# Patient Record
Sex: FEMALE | Race: WHITE | URBAN - METROPOLITAN AREA
[De-identification: names, ages, dates, MRNs, and addresses within clinical notes are randomized per-mention and may not be internally consistent; named-entity substitution may affect disease eponyms.]

---

## 2018-03-28 ENCOUNTER — HISTORICAL (OUTPATIENT)
Dept: ADMINISTRATIVE | Facility: HOSPITAL | Age: 53
End: 2018-03-28

## 2018-05-08 ENCOUNTER — HISTORICAL (OUTPATIENT)
Dept: ADMINISTRATIVE | Facility: HOSPITAL | Age: 53
End: 2018-05-08

## 2018-05-10 LAB — FINAL CULTURE: NORMAL

## 2018-05-18 ENCOUNTER — HISTORICAL (OUTPATIENT)
Dept: ADMINISTRATIVE | Facility: HOSPITAL | Age: 53
End: 2018-05-18

## 2018-09-27 ENCOUNTER — HISTORICAL (OUTPATIENT)
Dept: ADMINISTRATIVE | Facility: HOSPITAL | Age: 53
End: 2018-09-27

## 2018-11-02 LAB
INFLUENZA A ANTIGEN, POC: NEGATIVE
INFLUENZA B ANTIGEN, POC: NEGATIVE

## 2018-11-16 LAB
ABS NEUT (OLG): 4.12 X10(3)/MCL (ref 2.1–9.2)
ALBUMIN SERPL-MCNC: 3.6 GM/DL (ref 3.4–5)
ALBUMIN/GLOB SERPL: 1.1 RATIO (ref 1.1–2)
ALP SERPL-CCNC: 144 UNIT/L (ref 38–126)
ALT SERPL-CCNC: 47 UNIT/L (ref 12–78)
APPEARANCE, UA: CLEAR
APTT PPP: 35.2 SECOND(S) (ref 24.8–36.9)
AST SERPL-CCNC: 20 UNIT/L (ref 15–37)
BACTERIA SPEC CULT: ABNORMAL /HPF
BASOPHILS # BLD AUTO: 0.1 X10(3)/MCL (ref 0–0.2)
BASOPHILS NFR BLD AUTO: 1 %
BILIRUB SERPL-MCNC: 0.5 MG/DL (ref 0.2–1)
BILIRUB UR QL STRIP: NEGATIVE
BILIRUBIN DIRECT+TOT PNL SERPL-MCNC: 0.1 MG/DL (ref 0–0.5)
BILIRUBIN DIRECT+TOT PNL SERPL-MCNC: 0.4 MG/DL (ref 0–0.8)
BUN SERPL-MCNC: 14 MG/DL (ref 7–18)
CALCIUM SERPL-MCNC: 9 MG/DL (ref 8.5–10.1)
CHLORIDE SERPL-SCNC: 106 MMOL/L (ref 98–107)
CO2 SERPL-SCNC: 30 MMOL/L (ref 21–32)
COLOR UR: YELLOW
CREAT SERPL-MCNC: 0.96 MG/DL (ref 0.55–1.02)
EOSINOPHIL # BLD AUTO: 0.5 X10(3)/MCL (ref 0–0.9)
EOSINOPHIL NFR BLD AUTO: 8 %
ERYTHROCYTE [DISTWIDTH] IN BLOOD BY AUTOMATED COUNT: 12.8 % (ref 11.5–17)
GLOBULIN SER-MCNC: 3.3 GM/DL (ref 2.4–3.5)
GLUCOSE (UA): NEGATIVE
GLUCOSE SERPL-MCNC: 84 MG/DL (ref 74–106)
HCT VFR BLD AUTO: 51.2 % (ref 37–47)
HGB BLD-MCNC: 15.8 GM/DL (ref 12–16)
HGB UR QL STRIP: NEGATIVE
INR PPP: 1.2 (ref 0–1.3)
KETONES UR QL STRIP: NEGATIVE
LEUKOCYTE ESTERASE UR QL STRIP: NEGATIVE
LYMPHOCYTES # BLD AUTO: 1.4 X10(3)/MCL (ref 0.6–4.6)
LYMPHOCYTES NFR BLD AUTO: 22 %
MCH RBC QN AUTO: 30.3 PG (ref 27–31)
MCHC RBC AUTO-ENTMCNC: 30.9 GM/DL (ref 33–36)
MCV RBC AUTO: 98.1 FL (ref 80–94)
MONOCYTES # BLD AUTO: 0.4 X10(3)/MCL (ref 0.1–1.3)
MONOCYTES NFR BLD AUTO: 7 %
NEUTROPHILS # BLD AUTO: 4.12 X10(3)/MCL (ref 2.1–9.2)
NEUTROPHILS NFR BLD AUTO: 63 %
NITRITE UR QL STRIP: NEGATIVE
PH UR STRIP: 6 [PH] (ref 5–9)
PLATELET # BLD AUTO: 223 X10(3)/MCL (ref 130–400)
PMV BLD AUTO: 10.5 FL (ref 9.4–12.4)
POTASSIUM SERPL-SCNC: 4.9 MMOL/L (ref 3.5–5.1)
PROT SERPL-MCNC: 6.9 GM/DL (ref 6.4–8.2)
PROT UR QL STRIP: ABNORMAL
PROTHROMBIN TIME: 16.1 SECOND(S) (ref 12.2–14.7)
RBC # BLD AUTO: 5.22 X10(6)/MCL (ref 4.2–5.4)
RBC #/AREA URNS HPF: ABNORMAL /[HPF]
SODIUM SERPL-SCNC: 140 MMOL/L (ref 136–145)
SP GR UR STRIP: 1.02 (ref 1–1.03)
SQUAMOUS EPITHELIAL, UA: 7 /HPF (ref 0–4)
UROBILINOGEN UR STRIP-ACNC: 0.2
WBC # SPEC AUTO: 6.6 X10(3)/MCL (ref 4.5–11.5)
WBC #/AREA URNS HPF: ABNORMAL /[HPF]

## 2018-11-19 ENCOUNTER — HISTORICAL (OUTPATIENT)
Dept: ADMINISTRATIVE | Facility: HOSPITAL | Age: 53
End: 2018-11-19

## 2018-11-19 LAB
ABS NEUT (OLG): 6.72 X10(3)/MCL (ref 2.1–9.2)
BASOPHILS # BLD AUTO: 0 X10(3)/MCL (ref 0–0.2)
BASOPHILS NFR BLD AUTO: 0 %
BUN SERPL-MCNC: 19 MG/DL (ref 7–18)
CALCIUM SERPL-MCNC: 8.3 MG/DL (ref 8.5–10.1)
CHLORIDE SERPL-SCNC: 108 MMOL/L (ref 98–107)
CO2 SERPL-SCNC: 26 MMOL/L (ref 21–32)
CREAT SERPL-MCNC: 1.02 MG/DL (ref 0.55–1.02)
CREAT/UREA NIT SERPL: 18.6
CROSSMATCH INTERPRETATION: NORMAL
EOSINOPHIL # BLD AUTO: 0.3 X10(3)/MCL (ref 0–0.9)
EOSINOPHIL NFR BLD AUTO: 4 %
ERYTHROCYTE [DISTWIDTH] IN BLOOD BY AUTOMATED COUNT: 12.9 % (ref 11.5–17)
GLUCOSE SERPL-MCNC: 124 MG/DL (ref 74–106)
GROUP & RH: NORMAL
HCT VFR BLD AUTO: 47 % (ref 37–47)
HGB BLD-MCNC: 14.8 GM/DL (ref 12–16)
LYMPHOCYTES # BLD AUTO: 1 X10(3)/MCL (ref 0.6–4.6)
LYMPHOCYTES NFR BLD AUTO: 11 %
MCH RBC QN AUTO: 31.2 PG (ref 27–31)
MCHC RBC AUTO-ENTMCNC: 31.5 GM/DL (ref 33–36)
MCV RBC AUTO: 99.2 FL (ref 80–94)
MONOCYTES # BLD AUTO: 0.4 X10(3)/MCL (ref 0.1–1.3)
MONOCYTES NFR BLD AUTO: 4 %
NEUTROPHILS # BLD AUTO: 6.72 X10(3)/MCL (ref 2.1–9.2)
NEUTROPHILS NFR BLD AUTO: 79 %
PLATELET # BLD AUTO: 210 X10(3)/MCL (ref 130–400)
PMV BLD AUTO: 10.4 FL (ref 9.4–12.4)
POTASSIUM SERPL-SCNC: 4.5 MMOL/L (ref 3.5–5.1)
PRODUCT READY: NORMAL
RBC # BLD AUTO: 4.74 X10(6)/MCL (ref 4.2–5.4)
SODIUM SERPL-SCNC: 141 MMOL/L (ref 136–145)
WBC # SPEC AUTO: 8.5 X10(3)/MCL (ref 4.5–11.5)

## 2018-12-12 ENCOUNTER — HISTORICAL (OUTPATIENT)
Dept: ADMINISTRATIVE | Facility: HOSPITAL | Age: 53
End: 2018-12-12

## 2019-01-15 ENCOUNTER — HISTORICAL (OUTPATIENT)
Dept: ADMINISTRATIVE | Facility: HOSPITAL | Age: 54
End: 2019-01-15

## 2019-02-21 ENCOUNTER — HISTORICAL (OUTPATIENT)
Dept: ADMINISTRATIVE | Facility: HOSPITAL | Age: 54
End: 2019-02-21

## 2019-05-21 ENCOUNTER — HISTORICAL (OUTPATIENT)
Dept: ADMINISTRATIVE | Facility: HOSPITAL | Age: 54
End: 2019-05-21

## 2019-09-17 ENCOUNTER — HISTORICAL (OUTPATIENT)
Dept: RADIOLOGY | Facility: HOSPITAL | Age: 54
End: 2019-09-17

## 2019-12-23 ENCOUNTER — HISTORICAL (OUTPATIENT)
Dept: ADMINISTRATIVE | Facility: HOSPITAL | Age: 54
End: 2019-12-23

## 2022-04-07 ENCOUNTER — HISTORICAL (OUTPATIENT)
Dept: ADMINISTRATIVE | Facility: HOSPITAL | Age: 57
End: 2022-04-07

## 2022-04-23 VITALS
OXYGEN SATURATION: 98 % | HEIGHT: 68 IN | SYSTOLIC BLOOD PRESSURE: 108 MMHG | DIASTOLIC BLOOD PRESSURE: 78 MMHG | BODY MASS INDEX: 44.1 KG/M2 | WEIGHT: 291 LBS

## 2022-04-30 NOTE — OP NOTE
Patient:   Mindy Adkins            MRN: [id number]            FIN: [id number]               Age:   53 years     Sex:  Female     :  1965   Associated Diagnoses:   None   Author:   Baltazar Alvarado IV, MD      Brief Operative Note   Operative Information   Date/ Time:  2018 13:04:00.     Preoperative Diagnosis (Atrial fibrillation).     Postoperative Diagnosis (same).     Procedures Performed (Extensive Maze Atrial Ablation via Marty. VATS;  Ligation Left Atrial Appendage with 45mm atriclip).     Surgeon: Baltazar Alvarado IV, MD.     Assistant: Hilario De La Rosa.     Marty. CT's.

## 2022-04-30 NOTE — DISCHARGE SUMMARY
DISCHARGE DATE:  11/21/2018    FINAL DIAGNOSES:    1. History of atrial fibrillation with previous ablations.    2. Congestive heart failure.  3. Anxiety.  4. Depression.  5. Morbid obesity.  6. Hypertension.  7. Sick sinus syndrome.    PROCEDURES PERFORMED:  Extensive maze atrial ablation via bilateral VATS and ligation of left atrial appendage 45 mm AtriClip.    HOSPITAL COURSE:  53-year-old female with atrial fibrillation for past 13 years, multiple ablations, remained in chronic atrial fibrillation.  She underwent all the above procedures.  Postop, doing well.  Her pain is well controlled.  Her bilateral chest tube was removed with minimal drainage.  Chest x-ray showed lung fully expanded.  She weaned off her oxygen.  Wound incision is clean.  She is now being discharged home in good condition.    DISCHARGE INSTRUCTIONS:  She has been instructed on diet, wound care, and activity.  Medications she was taking prior will be continued.  Follow up with Dr. Baltazar Alvarado in 3 weeks.        ______________________________  MD ESTUARDO Madera IV/ALIYA  DD:  12/12/2018  Time:  12:11PM  DT:  12/12/2018  Time:  12:26PM  Job #:  [id number]

## 2022-04-30 NOTE — OP NOTE
DATE OF SURGERY:    11/19/2018    SURGEON:  Baltazar Alvarado IV, MD  ASSISTANT:  MARCO Contreras    PREOPERATIVE DIAGNOSIS:  Atrial fibrillation.    POSTOPERATIVE DIAGNOSIS:  Atrial fibrillation.    PROCEDURES:  Extensive maze atrial ablation via bilateral video-assisted thorascopic surgery; ligation of left atrial appendage with a 45 mm AtriClip.    ANESTHESIA:  General endotracheal with a double lumen tube.    DRAINS:  Bilateral 19 Luis Alberto chest tubes.    PROCEDURE IN DETAIL:  The patient was taken to the operating room under informed consent and placed on the table in supine position where general endotracheal anesthesia was induced by the anesthesia department.  She was prepped and draped in the usual sterile fashion over the entire right chest and left chest with the right side propped up a little bit.  Three incisions were made for ports and instrumentation, the lung decompressed, and the pericardium incised.  The pericardium was tacked up with stay sutures for retraction.  Oblique sinus was then entered and created, and then dissection ensued between the pulmonary artery and the superior pulmonary vein.  Next, the pulmonary vein dissector device from [employer] was used to dissect around the backside of the pulmonary veins and come up around the superior pulmonary vein.  The red rubber catheter was passed through this space and attached to the ablation probe.  Ablation probe was then slipped around the veins and the pulmonary veins on the right side were clamped and multiple firings performed with the radiofrequency ablation probe circumferentially around both sets of pulmonary veins on the right.  This clamp was then removed and then using a linear ablation pen, floor lesions were created across the inferior portion of the atrium and then ceiling lesion across the dome of the atrium were also created.  Additionally, on the right side of the heart, a cava to cava line was performed with a crossing  lesion extending up onto the right atrium.  Once this was completed, a 19 Luis Alberto drain was brought out through one of the incisions and secured to the skin.  The remaining incision was closed with 2-0 Vicryl and the skin with a 3-0 subcuticular stitch.  With the left side propped up slightly, 3 incisions were made and chest cavity entered.  The pericardium was incised, and again the left pulmonary veins were encircled with the pulmonary vein dissected and coming out superiorly.  Once doing this, the sleeve attached to the red rubber was pulled through this space and attached to the ablation clamp and then the ablation clamp then pulled circumferentially around the pulmonary veins on the left side.  Once in appropriate place, lesions were created circumferentially around this left set of pulmonary veins with multiple firings of the radiofrequency ablation probe.  In a similar fashion, the probe was then removed and the linear pen ablation probe was used to complete both the floor lesion connecting both sets of pulmonary vein lesions and then the ceiling lesion on the dome of the atrium.  With this satisfactory completed, testing was done.  Then, no conduction was noted across the ablation lines.  The atrial appendage was measured and a 45 mm AtriClip selected and deployed across the base of the left atrial appendage.  Once hemostasis was assured, a 19 Luis Alberto drain was placed in the left chest, brought out through one incision and secured to the skin.  The remaining incision was closed with 2-0 Vicryl and the skin with a 3-0 subcuticular stitch.  The patient was then cardioverted back to a sinus rhythm.  She tolerated this procedure well and left the operating room in stable condition.        ______________________________  MD ESTUARDO Madera IV/MITZI  DD:  11/19/2018  Time:  01:11PM  DT:  11/19/2018  Time:  01:51PM  Job #:  [id number]

## 2022-04-30 NOTE — H&P
CHIEF COMPLAINT:  Atrial fibrillation.    HISTORY OF PRESENT ILLNESS:  This is a 53-year-old female with atrial fibrillation for the past 13 years, had multiple ablations already and dual-chamber pacemaker placement.  She is admitted with chronic AFib and chronic fatigue as well.  She is going to be admitted for bilateral VATS maze ligation of left atrial appendage.    PAST MEDICAL HISTORY:    1. CHF.   2. Atrial fibrillation.   3. Anxiety.   4. Depression.  5. Morbid obesity.    6. Hypertension.   7. Sick sinus syndrome.    MEDICATIONS:  See list.    ALLERGIES:  None.    SOCIAL HISTORY:  Noncontributory.    FAMILY HISTORY:  Positive for MI, hypertension.    REVIEW OF SYSTEMS:  Positive for the above.    PHYSICAL EXAMINATION:  GENERAL:  The patient is resting in bed in no acute distress.  VITAL SIGNS:  Stable.   HEENT:  Head normocephalic, atraumatic.  Pupils equal, round and reactive to light.  Sclerae, nonicteric.  Oral mucosa is pink and moist.  No lesions.     NECK:  Supple.  No JVD or carotid bruits.   HEART:  Irregular rate and rhythm.  No murmurs, gallops, or rubs.   LUNGS:  Clear to auscultation bilaterally.     ABDOMEN:  Soft and nontender.  Positive bowel sounds, no organomegaly.     EXTREMITIES:  No cyanosis, clubbing or edema.     NEUROLOGIC:  Cranial nerves 2-12 grossly intact.    ASSESSMENT:  The patient has paroxysmal atrial fibrillation with hypertensive cardiomyopathy, obesity, anemia, history of ablation with pacemaker implantation.    PLAN:  The patient is admitted for bilateral VATS ligation of left atrial appendage in May.  Risks and benefits have been discussed.  The patient will proceed this morning.        ______________________________  MARCO Contreras/NEHEMIAS  DD:  11/19/2018  Time:  07:38AM  DT:  11/19/2018  Time:  08:07AM  Job #:  [id number]    The H&P was reviewed, the patient was examined, and the following changes to the patients condition are  noted:  ______________________________________________________________________________  ______________________________________________________________________________  ______________________________________________________________________________  [  ] No changes to the patient's condition:      ______________________________                                             ___________________  PHYSICIAN SIGNATURE                                                             DATE/TIME

## 2022-09-15 ENCOUNTER — HISTORICAL (OUTPATIENT)
Dept: ADMINISTRATIVE | Facility: HOSPITAL | Age: 57
End: 2022-09-15